# Patient Record
Sex: FEMALE | Race: BLACK OR AFRICAN AMERICAN | NOT HISPANIC OR LATINO | URBAN - METROPOLITAN AREA
[De-identification: names, ages, dates, MRNs, and addresses within clinical notes are randomized per-mention and may not be internally consistent; named-entity substitution may affect disease eponyms.]

---

## 2024-08-12 ENCOUNTER — EMERGENCY (EMERGENCY)
Facility: HOSPITAL | Age: 54
LOS: 1 days | Discharge: HOME | End: 2024-08-12
Attending: STUDENT IN AN ORGANIZED HEALTH CARE EDUCATION/TRAINING PROGRAM | Admitting: STUDENT IN AN ORGANIZED HEALTH CARE EDUCATION/TRAINING PROGRAM
Payer: COMMERCIAL

## 2024-08-12 VITALS
RESPIRATION RATE: 16 BRPM | TEMPERATURE: 98 F | HEART RATE: 76 BPM | HEIGHT: 61 IN | DIASTOLIC BLOOD PRESSURE: 100 MMHG | WEIGHT: 154.1 LBS | OXYGEN SATURATION: 100 % | SYSTOLIC BLOOD PRESSURE: 174 MMHG

## 2024-08-12 PROCEDURE — 93010 ELECTROCARDIOGRAM REPORT: CPT

## 2024-08-12 PROCEDURE — 99284 EMERGENCY DEPT VISIT MOD MDM: CPT

## 2024-08-12 RX ORDER — ACETAMINOPHEN 500 MG
975 TABLET ORAL ONCE
Refills: 0 | Status: COMPLETED | OUTPATIENT
Start: 2024-08-12 | End: 2024-08-12

## 2024-08-12 RX ORDER — LIDOCAINE 5% 5 G/100G
1 CREAM TOPICAL ONCE
Refills: 0 | Status: COMPLETED | OUTPATIENT
Start: 2024-08-12 | End: 2024-08-12

## 2024-08-12 RX ORDER — IBUPROFEN 200 MG
400 TABLET ORAL ONCE
Refills: 0 | Status: COMPLETED | OUTPATIENT
Start: 2024-08-12 | End: 2024-08-12

## 2024-08-12 RX ADMIN — LIDOCAINE 5% 1 PATCH: 5 CREAM TOPICAL at 12:00

## 2024-08-12 RX ADMIN — Medication 400 MILLIGRAM(S): at 12:00

## 2024-08-12 RX ADMIN — Medication 975 MILLIGRAM(S): at 12:00

## 2024-08-12 NOTE — ED PROVIDER NOTE - PROGRESS NOTE DETAILS
Gael Ramirez MD PGY-4  Patient seen & evaluated w/o midline spine tenderness. Some mild left lower paraspinal TTP.  Suspect benign MSK pain. Rec PCP follow-up for PT & ongoing mangement.

## 2024-08-12 NOTE — ED PROVIDER NOTE - NSFOLLOWUPINSTRUCTIONS_ED_ALL_ED_FT
You were seen in the emergency department for low back pain.  This is most likely a muscle spasm/pulled muscle after lifting the other day.  You had no evidence of neurologic compromise or spinal fracture at the time of the evaluation.    For this problem we generally recommend over-the-counter pain control regimens and follow-up with your primary care provider for physical therapy referral.    You can take acetaminophen 975 to 1000 mg by mouth every 6 hours as well as ibuprofen 400 mg by mouth every 6 hours.  Do this for 2 days.  You can also apply a lidocaine patch for 12 hours once every 24 hours according to the package instructions (this is also available over-the-counter).    You should return to the emergency department if you develop any weakness/numbness, tingling, difficulty walking, worsening of your pain, or any other new or concerning symptoms that you would like to have evaluated.

## 2024-08-12 NOTE — ED PROVIDER NOTE - CARE PROVIDER_API CALL
[place] [address]  Phone: [phone]  Fax: [phone]  Established Patient  Follow Up Time: 1-3 Days

## 2024-08-12 NOTE — ED PROVIDER NOTE - OBJECTIVE STATEMENT
52 y/o F with PMH of HTN c/o acute on chronic back pain x 3 days. 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 Pt 54 y/o F with PMH of HTN c/o acute on chronic back pain x 3 days. 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 Pain does not radiate anywhere. Pt has not tried any OTC. Pt is able to ambulate and urinate without any issues.    Pt denies any fevers, numbness/tingling in extremities, urinary incontinence, n/v/d, trauma, dysuria, hematuria, urinary urgency/frequency, hx of kidney stones.

## 2024-08-12 NOTE — ED ADULT NURSE REASSESSMENT NOTE - NS ED NURSE REASSESS COMMENT FT1
Report received from RN. Pt resting in spot 7a. Pt endorses partial relief of pain at this time. Airway is patent, respirations are even and unlabored. Plan of care ongoing,. safety maintained.

## 2024-08-12 NOTE — ED ADULT NURSE NOTE - OBJECTIVE STATEMENT
pt is a 53y old female received awake and responsive, c/o of lower back pain for few days, pt denies any trauma, no neuro deficits noted

## 2024-08-12 NOTE — ED PROVIDER NOTE - CLINICAL SUMMARY MEDICAL DECISION MAKING FREE TEXT BOX
54 y/o F with PMH of HTN c/o acute on chronic back pain x 3 days.    Differential diagnosis includes but not limited to muscle spasm, muscle strain, nephrolithiasis, fracture.  Less likely nephrolithiasis due to lack of flank pain or urinary symptoms.   Less likely fracture due to no spine tenderness on exam.  Likely MSK origin: muscle spasm/strain: will give tylenols, Motrin, and lidocaine if pain resolves, will likely dc home with PCP f/u.

## 2024-08-12 NOTE — ED ADULT TRIAGE NOTE - CHIEF COMPLAINT QUOTE
Pt c/o lower back pain x2 days. 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 Denies any numbness, tingling, chest pain, sob, dizziness or palpitations. PMH HTN. Hypertensive in triage - endorses compliance with medication. Pt c/o lower back pain x2 days. 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 Denies any numbness, tingling, chest pain, sob, dizziness, headache, weakness or palpitations. PMH HTN. Hypertensive in triage - endorses compliance with medication.

## 2024-08-12 NOTE — ED ADULT NURSE NOTE - NSFALLUNIVINTERV_ED_ALL_ED
Bed/Stretcher in lowest position, wheels locked, appropriate side rails in place/Call bell, personal items and telephone in reach/Instruct patient to call for assistance before getting out of bed/chair/stretcher/Non-slip footwear applied when patient is off stretcher/[place] to call system/Physically safe environment - no spills, clutter or unnecessary equipment/Purposeful proactive rounding/Room/bathroom lighting operational, light cord in reach

## 2024-08-12 NOTE — ED ADULT NURSE NOTE - CHIEF COMPLAINT QUOTE
Pt c/o lower back pain x2 days. 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 Denies any numbness, tingling, chest pain, sob, dizziness, headache, weakness or palpitations. PMH HTN. Hypertensive in triage - endorses compliance with medication.

## 2024-08-12 NOTE — ED PROVIDER NOTE - ATTENDING CONTRIBUTION TO CARE
Dr. Schwarz, Attending Physician-  I performed a face to face bedside interview with patient regarding history of present illness, review of symptoms and past medical history. I completed an independent physical exam.  I have discussed patient's plan of care with the resident.    52 y/o F with PMH of HTN c/o acute on chronic back pain x 3 days. 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 Pain does not radiate anywhere. Pt has not tried any OTC. Pt is able to ambulate and urinate without any issues. Denies fevers, numbness/tingling in extremities, urinary incontinence, n/v/d, trauma, dysuria, hematuria, urinary urgency/frequency, hx of kidney stones. No Hx of back surgery or cancer. ROS otherwise negative.    Const: Awake, alert, no acute distress.  Well appearing.  Moving comfortably on stretcher.  HEENT: NC/AT.  Moist mucous membranes.  No pharyngeal erythema, no exudates.  Eyes: Extraocular movements intact b/l.  Conjunctiva pink.  No scleral icterus.  Neck: Neck supple, full ROM without pain.  Cardiac: Regular rate and regular rhythm. S1 S2 present.  Peripheral pulses 2+ and symmetric. No LE edema.  Resp: Speaking in full sentences. No evidence of respiratory distress.  Breath sounds clear to auscultation b/l. Normal chest excursion.   Abd: Non-distended, no overlying skin changes.  Soft, non-tender, no guarding, no rigidity, no rebound tenderness.   Back: Spine midline and non-tender. No CVAT.  Skin: Normal coloration.  No rashes, abrasions or lacerations.  Neuro: Awake, alert & oriented x 3.  Moves all extremities spontaneously.  No focal deficits. Gait WNL.    Differential diagnosis includes but not limited to muscle spasm, muscle strain, nephrolithiasis, fracture less likely. Less likely nephrolithiasis due to lack of flank pain or urinary symptoms.   Likely MSK origin: muscle spasm/strain: will treat symptomatically with tylenol, Motrin, and lidocaine patch,  will likely dc home with PCP f/u. +/- spine center f/u. - Oswald Schwarz MD. EM Attending

## 2024-08-12 NOTE — ED PROVIDER NOTE - PHYSICAL EXAMINATION
Const: Awake, alert, no acute distress.  Well appearing.  Moving comfortably on stretcher.  HEENT: NC/AT.  Moist mucous membranes.  No pharyngeal erythema, no exudates.  Eyes: Extraocular movements intact b/l.  Conjunctiva pink.  No scleral icterus.  Neck: Neck supple, full ROM without pain.  Cardiac: Regular rate and regular rhythm. S1 S2 present.  Peripheral pulses 2+ and symmetric. No LE edema.  Resp: Speaking in full sentences. No evidence of respiratory distress.  Breath sounds clear to auscultation b/l. Normal chest excursion.   Abd: Non-distended, no overlying skin changes.  Soft, non-tender, no guarding, no rigidity, no rebound tenderness.   Back: Spine midline and non-tender. No CVAT.  Skin: Normal coloration.  No rashes, abrasions or lacerations.  Neuro: Awake, alert & oriented x 3.  Moves all extremities spontaneously.  No focal deficits.

## 2024-08-12 NOTE — ED PROVIDER NOTE - PATIENT PORTAL LINK FT
You can access the FollowMyHealth Patient Portal offered by [facility] [place] by registering at the following website: [email or web address]. By joining [employer]’s FollowMyHealth portal, you will also be able to view your health information using other applications (apps) compatible with our system.